# Patient Record
Sex: FEMALE | Race: OTHER | URBAN - METROPOLITAN AREA
[De-identification: names, ages, dates, MRNs, and addresses within clinical notes are randomized per-mention and may not be internally consistent; named-entity substitution may affect disease eponyms.]

---

## 2017-07-24 ENCOUNTER — EMERGENCY (EMERGENCY)
Facility: HOSPITAL | Age: 22
LOS: 1 days | Discharge: HOME | End: 2017-07-24
Admitting: EMERGENCY MEDICINE
Payer: COMMERCIAL

## 2017-07-24 ENCOUNTER — TRANSCRIPTION ENCOUNTER (OUTPATIENT)
Age: 22
End: 2017-07-24

## 2017-07-24 VITALS
OXYGEN SATURATION: 98 % | HEART RATE: 79 BPM | RESPIRATION RATE: 18 BRPM | SYSTOLIC BLOOD PRESSURE: 108 MMHG | TEMPERATURE: 99 F | DIASTOLIC BLOOD PRESSURE: 75 MMHG

## 2017-07-24 VITALS
HEART RATE: 89 BPM | SYSTOLIC BLOOD PRESSURE: 110 MMHG | TEMPERATURE: 99 F | OXYGEN SATURATION: 97 % | RESPIRATION RATE: 18 BRPM | WEIGHT: 130.95 LBS | DIASTOLIC BLOOD PRESSURE: 76 MMHG

## 2017-07-24 DIAGNOSIS — M54.5 LOW BACK PAIN: ICD-10-CM

## 2017-07-24 DIAGNOSIS — R20.2 PARESTHESIA OF SKIN: ICD-10-CM

## 2017-07-24 DIAGNOSIS — R11.0 NAUSEA: ICD-10-CM

## 2017-07-24 DIAGNOSIS — Z79.899 OTHER LONG TERM (CURRENT) DRUG THERAPY: ICD-10-CM

## 2017-07-24 PROBLEM — Z00.00 ENCOUNTER FOR PREVENTIVE HEALTH EXAMINATION: Status: ACTIVE | Noted: 2017-07-24

## 2017-07-24 PROCEDURE — 72100 X-RAY EXAM L-S SPINE 2/3 VWS: CPT | Mod: 26

## 2017-07-24 PROCEDURE — 72100 X-RAY EXAM L-S SPINE 2/3 VWS: CPT

## 2017-07-24 PROCEDURE — 96372 THER/PROPH/DIAG INJ SC/IM: CPT

## 2017-07-24 PROCEDURE — 99283 EMERGENCY DEPT VISIT LOW MDM: CPT | Mod: 25

## 2017-07-24 PROCEDURE — 99284 EMERGENCY DEPT VISIT MOD MDM: CPT

## 2017-07-24 RX ORDER — KETOROLAC TROMETHAMINE 30 MG/ML
60 SYRINGE (ML) INJECTION ONCE
Qty: 0 | Refills: 0 | Status: DISCONTINUED | OUTPATIENT
Start: 2017-07-24 | End: 2017-07-24

## 2017-07-24 RX ORDER — SERTRALINE 25 MG/1
0 TABLET, FILM COATED ORAL
Qty: 0 | Refills: 0 | COMMUNITY

## 2017-07-24 RX ORDER — DIAZEPAM 5 MG
2 TABLET ORAL ONCE
Qty: 0 | Refills: 0 | Status: DISCONTINUED | OUTPATIENT
Start: 2017-07-24 | End: 2017-07-24

## 2017-07-24 RX ORDER — SPIRONOLACTONE 25 MG/1
0 TABLET, FILM COATED ORAL
Qty: 0 | Refills: 0 | COMMUNITY

## 2017-07-24 RX ORDER — DIAZEPAM 5 MG
1 TABLET ORAL
Qty: 9 | Refills: 0 | OUTPATIENT
Start: 2017-07-24 | End: 2017-07-27

## 2017-07-24 RX ADMIN — Medication 60 MILLIGRAM(S): at 14:32

## 2017-07-24 RX ADMIN — Medication 2 MILLIGRAM(S): at 14:32

## 2017-07-24 NOTE — ED ADULT TRIAGE NOTE - CHIEF COMPLAINT QUOTE
patient states that back in december she slipped and hit her lower back. she never saw a doctor for this. she states that every so often she has lower back pain. yesterday she was walking a lot and started to feel tingling down the back of her legs. denies urinary/ bowel incontinence. patient has been taking ibuprofen with minimal relief. able to ambulate with difficulty, went to [facility] and was sent to ED for further eval.

## 2017-07-24 NOTE — ED PROVIDER NOTE - OBJECTIVE STATEMENT
20 yo F w/ no significant PMH, p/w lower back pain. Pt slipped and fell down marble stairs in February sustaining injury to her back, but never went to a doctor to get it checked out. Pt reports that when she overexerts herself, she experiences severe back pain. Pt was walking all day yesterday and when she went to take off her shoes, her back was stuck. Pt reports tingling in the posterior aspect of the upper region of the lower extremities that stops at her knee and feet numbness. Pt reports dysuria and nausea. Pt denies any chance of pregnancy or abd pain. Pt took Ibuprofen and nausea medication. 20 yo F w/ no significant PMH, p/w lower back pain. Pt slipped and fell down marble stairs in February sustaining injury to her back, but never went to a doctor to get it checked out. Pt reports that when she overexerts herself, she experiences severe back pain. Pt was walking all day yesterday and when she went to take off her shoes, her back was stuck. Pt reports tingling in the posterior aspect of the upper region of the lower extremities that stops at her knee and feet numbness. Pt reports of no dysuria and nausea. Pt denies any chance of pregnancy or abd pain. Pt took Ibuprofen medication.

## 2017-07-24 NOTE — ED PROVIDER NOTE - MEDICAL DECISION MAKING DETAILS
Patient improved after pain meds and  increase movements. Recommend f/u with spine. Continue current pain meds as necessary. Ice/heat therapy.

## 2017-07-24 NOTE — ED ADULT NURSE NOTE - OBJECTIVE STATEMENT
patient complains of lower back pain radiating down the legs since yesterday, she states that she had an injury in December and since then has been having back pain on and off. yesterday she was walking a lot and the pain worsened now radiating with tingling in the backs of her legs. denies incontinence.

## 2017-07-24 NOTE — ED ADULT NURSE NOTE - CHIEF COMPLAINT QUOTE
patient states that back in december she slipped and hit her lower back. she never saw a doctor for this. she states that every so often she has lower back pain. yesterday she was walking a lot and started to feel tingling down the back of her legs. denies urinary/ bowel incontinence. patient has been taking ibuprofen with minimal relief. able to ambulate with difficulty, went to [facility] and was sent to ED for further eval.

## 2017-07-24 NOTE — ED PROVIDER NOTE - CHPI ED SYMPTOMS NEG
no bladder dysfunction/no neck tenderness/no numbness/no bowel dysfunction/no motor function loss/no tingling

## 2017-07-25 ENCOUNTER — APPOINTMENT (OUTPATIENT)
Dept: ORTHOPEDIC SURGERY | Facility: CLINIC | Age: 22
End: 2017-07-25

## 2017-07-25 VITALS
SYSTOLIC BLOOD PRESSURE: 110 MMHG | WEIGHT: 131 LBS | BODY MASS INDEX: 19.4 KG/M2 | DIASTOLIC BLOOD PRESSURE: 70 MMHG | HEIGHT: 69 IN

## 2017-07-25 DIAGNOSIS — Z82.62 FAMILY HISTORY OF OSTEOPOROSIS: ICD-10-CM

## 2017-07-25 DIAGNOSIS — Z56.0 UNEMPLOYMENT, UNSPECIFIED: ICD-10-CM

## 2017-07-25 DIAGNOSIS — Z80.9 FAMILY HISTORY OF MALIGNANT NEOPLASM, UNSPECIFIED: ICD-10-CM

## 2017-07-25 DIAGNOSIS — Z78.9 OTHER SPECIFIED HEALTH STATUS: ICD-10-CM

## 2017-07-25 DIAGNOSIS — Z82.61 FAMILY HISTORY OF ARTHRITIS: ICD-10-CM

## 2017-07-25 RX ORDER — SERTRALINE HYDROCHLORIDE 25 MG/1
TABLET, FILM COATED ORAL
Refills: 0 | Status: ACTIVE | COMMUNITY

## 2017-07-25 RX ORDER — SPIRONOLACTONE 50 MG/1
TABLET ORAL
Refills: 0 | Status: ACTIVE | COMMUNITY

## 2017-07-25 SDOH — ECONOMIC STABILITY - INCOME SECURITY: UNEMPLOYMENT, UNSPECIFIED: Z56.0

## 2017-08-09 ENCOUNTER — APPOINTMENT (OUTPATIENT)
Dept: ORTHOPEDIC SURGERY | Facility: CLINIC | Age: 22
End: 2017-08-09
Payer: COMMERCIAL

## 2017-08-09 VITALS — BODY MASS INDEX: 19.4 KG/M2 | HEIGHT: 69 IN | WEIGHT: 131 LBS

## 2017-08-09 DIAGNOSIS — G89.29 LUMBAGO WITH SCIATICA, LEFT SIDE: ICD-10-CM

## 2017-08-09 DIAGNOSIS — M54.42 LUMBAGO WITH SCIATICA, LEFT SIDE: ICD-10-CM

## 2017-08-09 DIAGNOSIS — M54.41 LUMBAGO WITH SCIATICA, LEFT SIDE: ICD-10-CM

## 2017-08-09 PROCEDURE — 99215 OFFICE O/P EST HI 40 MIN: CPT

## 2017-08-09 RX ORDER — NAPROXEN 500 MG/1
500 TABLET ORAL
Qty: 60 | Refills: 0 | Status: ACTIVE | COMMUNITY
Start: 2017-08-09 | End: 1900-01-01

## 2017-08-13 ENCOUNTER — TRANSCRIPTION ENCOUNTER (OUTPATIENT)
Age: 22
End: 2017-08-13

## 2017-10-16 ENCOUNTER — APPOINTMENT (OUTPATIENT)
Dept: INTERNAL MEDICINE | Facility: CLINIC | Age: 22
End: 2017-10-16
Payer: COMMERCIAL

## 2017-10-16 VITALS
SYSTOLIC BLOOD PRESSURE: 100 MMHG | HEART RATE: 68 BPM | WEIGHT: 130 LBS | DIASTOLIC BLOOD PRESSURE: 60 MMHG | BODY MASS INDEX: 19.26 KG/M2 | HEIGHT: 69 IN | TEMPERATURE: 98.4 F | OXYGEN SATURATION: 99 %

## 2017-10-16 DIAGNOSIS — Z30.011 ENCOUNTER FOR INITIAL PRESCRIPTION OF CONTRACEPTIVE PILLS: ICD-10-CM

## 2017-10-16 DIAGNOSIS — F41.8 OTHER SPECIFIED ANXIETY DISORDERS: ICD-10-CM

## 2017-10-16 DIAGNOSIS — Z88.9 ALLERGY STATUS TO UNSPECIFIED DRUGS, MEDICAMENTS AND BIOLOGICAL SUBSTANCES: ICD-10-CM

## 2017-10-16 DIAGNOSIS — F15.90 OTHER STIMULANT USE, UNSPECIFIED, UNCOMPLICATED: ICD-10-CM

## 2017-10-16 DIAGNOSIS — R61 GENERALIZED HYPERHIDROSIS: ICD-10-CM

## 2017-10-16 DIAGNOSIS — L70.0 ACNE VULGARIS: ICD-10-CM

## 2017-10-16 DIAGNOSIS — Z97.3 PRESENCE OF SPECTACLES AND CONTACT LENSES: ICD-10-CM

## 2017-10-16 DIAGNOSIS — Z23 ENCOUNTER FOR IMMUNIZATION: ICD-10-CM

## 2017-10-16 DIAGNOSIS — Z12.72 ENCOUNTER FOR SCREENING FOR MALIGNANT NEOPLASM OF VAGINA: ICD-10-CM

## 2017-10-16 DIAGNOSIS — M51.26 OTHER INTERVERTEBRAL DISC DISPLACEMENT, LUMBAR REGION: ICD-10-CM

## 2017-10-16 PROCEDURE — 90686 IIV4 VACC NO PRSV 0.5 ML IM: CPT

## 2017-10-16 PROCEDURE — 99204 OFFICE O/P NEW MOD 45 MIN: CPT | Mod: 25

## 2017-10-16 PROCEDURE — 36415 COLL VENOUS BLD VENIPUNCTURE: CPT

## 2017-10-16 PROCEDURE — G0008: CPT

## 2017-10-16 RX ORDER — DEXTROAMPHETAMINE SACCHARATE, AMPHETAMINE ASPARTATE, DEXTROAMPHETAMINE SULFATE, AND AMPHETAMINE SULFATE 3.75; 3.75; 3.75; 3.75 MG/1; MG/1; MG/1; MG/1
TABLET ORAL
Refills: 0 | Status: COMPLETED | COMMUNITY
End: 2017-10-16

## 2017-10-16 RX ORDER — SERTRALINE HYDROCHLORIDE 100 MG/1
100 TABLET, FILM COATED ORAL
Qty: 40 | Refills: 4 | Status: ACTIVE | COMMUNITY
Start: 1900-01-01 | End: 1900-01-01

## 2017-10-17 LAB
ALBUMIN SERPL ELPH-MCNC: 4.3 G/DL
ALP BLD-CCNC: 48 U/L
ALT SERPL-CCNC: 18 U/L
ANION GAP SERPL CALC-SCNC: 16 MMOL/L
AST SERPL-CCNC: 20 U/L
BILIRUB SERPL-MCNC: 0.3 MG/DL
BUN SERPL-MCNC: 13 MG/DL
CALCIUM SERPL-MCNC: 9.7 MG/DL
CHLORIDE SERPL-SCNC: 101 MMOL/L
CO2 SERPL-SCNC: 24 MMOL/L
CREAT SERPL-MCNC: 0.77 MG/DL
GLUCOSE SERPL-MCNC: 91 MG/DL
POTASSIUM SERPL-SCNC: 4.6 MMOL/L
PROT SERPL-MCNC: 7.2 G/DL
SODIUM SERPL-SCNC: 141 MMOL/L
TSH SERPL-ACNC: 2.44 UIU/ML

## 2017-11-20 ENCOUNTER — TRANSCRIPTION ENCOUNTER (OUTPATIENT)
Age: 22
End: 2017-11-20

## 2018-01-08 ENCOUNTER — RX RENEWAL (OUTPATIENT)
Age: 23
End: 2018-01-08

## 2018-01-18 ENCOUNTER — RX RENEWAL (OUTPATIENT)
Age: 23
End: 2018-01-18

## 2018-01-18 RX ORDER — SPIRONOLACTONE 100 MG/1
100 TABLET ORAL
Qty: 45 | Refills: 4 | Status: ACTIVE | COMMUNITY
Start: 2018-01-18 | End: 1900-01-01

## 2018-05-01 ENCOUNTER — RX RENEWAL (OUTPATIENT)
Age: 23
End: 2018-05-01

## 2018-05-01 RX ORDER — NORETHINDRONE AND ETHINYL ESTRADIOL 0.4-0.035
0.4-35 KIT ORAL DAILY
Qty: 1 | Refills: 0 | Status: ACTIVE | COMMUNITY
Start: 2018-01-08 | End: 1900-01-01

## 2018-05-22 ENCOUNTER — RX RENEWAL (OUTPATIENT)
Age: 23
End: 2018-05-22

## 2018-06-27 ENCOUNTER — RX RENEWAL (OUTPATIENT)
Age: 23
End: 2018-06-27

## 2018-06-27 RX ORDER — SERTRALINE HYDROCHLORIDE 100 MG/1
100 TABLET, FILM COATED ORAL
Qty: 30 | Refills: 0 | Status: ACTIVE | COMMUNITY
Start: 2018-03-31 | End: 1900-01-01

## 2018-07-20 ENCOUNTER — TRANSCRIPTION ENCOUNTER (OUTPATIENT)
Age: 23
End: 2018-07-20

## 2018-07-25 PROBLEM — F41.8 ANXIETY WITH DEPRESSION: Status: ACTIVE | Noted: 2017-10-16

## 2018-09-17 ENCOUNTER — RX RENEWAL (OUTPATIENT)
Age: 23
End: 2018-09-17

## 2018-09-17 RX ORDER — SERTRALINE HYDROCHLORIDE 100 MG/1
100 TABLET, FILM COATED ORAL
Qty: 30 | Refills: 2 | Status: ACTIVE | COMMUNITY
Start: 2018-08-01 | End: 1900-01-01

## 2018-11-12 ENCOUNTER — RX RENEWAL (OUTPATIENT)
Age: 23
End: 2018-11-12

## 2018-12-08 ENCOUNTER — TRANSCRIPTION ENCOUNTER (OUTPATIENT)
Age: 23
End: 2018-12-08

## 2019-04-01 ENCOUNTER — MOBILE ON CALL (OUTPATIENT)
Age: 24
End: 2019-04-01

## 2019-04-18 ENCOUNTER — TRANSCRIPTION ENCOUNTER (OUTPATIENT)
Age: 24
End: 2019-04-18

## 2019-06-05 ENCOUNTER — RX CHANGE (OUTPATIENT)
Age: 24
End: 2019-06-05

## 2019-06-05 RX ORDER — SPIRONOLACTONE 100 MG/1
100 TABLET ORAL
Qty: 45 | Refills: 4 | Status: DISCONTINUED | COMMUNITY
Start: 2018-08-01 | End: 2019-06-05

## 2019-06-09 RX ORDER — SERTRALINE HYDROCHLORIDE 100 MG/1
100 TABLET, FILM COATED ORAL
Qty: 30 | Refills: 0 | Status: ACTIVE | COMMUNITY
Start: 2019-06-09 | End: 1900-01-01

## 2019-12-09 ENCOUNTER — RX CHANGE (OUTPATIENT)
Age: 24
End: 2019-12-09

## 2019-12-09 RX ORDER — SPIRONOLACTONE 100 MG/1
100 TABLET ORAL
Qty: 135 | Refills: 0 | Status: DISCONTINUED | COMMUNITY
Start: 2019-06-05 | End: 2019-12-09

## 2020-04-09 ENCOUNTER — RX RENEWAL (OUTPATIENT)
Age: 25
End: 2020-04-09

## 2020-04-09 RX ORDER — SPIRONOLACTONE 100 MG/1
100 TABLET ORAL
Qty: 45 | Refills: 0 | Status: ACTIVE | COMMUNITY
Start: 2019-12-09 | End: 1900-01-01

## 2020-06-02 ENCOUNTER — NON-APPOINTMENT (OUTPATIENT)
Age: 25
End: 2020-06-02

## 2020-06-02 ENCOUNTER — RX RENEWAL (OUTPATIENT)
Age: 25
End: 2020-06-02

## 2020-06-25 ENCOUNTER — RX RENEWAL (OUTPATIENT)
Age: 25
End: 2020-06-25

## 2020-06-26 ENCOUNTER — RX RENEWAL (OUTPATIENT)
Age: 25
End: 2020-06-26
